# Patient Record
Sex: FEMALE | Race: WHITE | NOT HISPANIC OR LATINO
[De-identification: names, ages, dates, MRNs, and addresses within clinical notes are randomized per-mention and may not be internally consistent; named-entity substitution may affect disease eponyms.]

---

## 2017-04-14 ENCOUNTER — RX RENEWAL (OUTPATIENT)
Age: 72
End: 2017-04-14

## 2017-04-14 ENCOUNTER — APPOINTMENT (OUTPATIENT)
Dept: ENDOCRINOLOGY | Facility: CLINIC | Age: 72
End: 2017-04-14

## 2017-04-24 ENCOUNTER — APPOINTMENT (OUTPATIENT)
Dept: ENDOCRINOLOGY | Facility: CLINIC | Age: 72
End: 2017-04-24

## 2017-04-24 VITALS
OXYGEN SATURATION: 97 % | WEIGHT: 139 LBS | DIASTOLIC BLOOD PRESSURE: 80 MMHG | SYSTOLIC BLOOD PRESSURE: 130 MMHG | BODY MASS INDEX: 23.73 KG/M2 | HEART RATE: 78 BPM | HEIGHT: 64.25 IN

## 2017-07-10 ENCOUNTER — LABORATORY RESULT (OUTPATIENT)
Age: 72
End: 2017-07-10

## 2017-07-10 LAB
T3RU NFR SERPL: 0.99 INDEX
T4 SERPL-MCNC: 7.3 UG/DL
TSH SERPL-ACNC: 0.13 UIU/ML

## 2017-07-11 LAB
THYROGLOB AB SERPL-ACNC: <20 IU/ML
THYROGLOB SERPL-MCNC: <0.2 NG/ML

## 2018-03-20 ENCOUNTER — RX RENEWAL (OUTPATIENT)
Age: 73
End: 2018-03-20

## 2018-04-10 ENCOUNTER — RX RENEWAL (OUTPATIENT)
Age: 73
End: 2018-04-10

## 2018-04-25 ENCOUNTER — APPOINTMENT (OUTPATIENT)
Dept: ENDOCRINOLOGY | Facility: CLINIC | Age: 73
End: 2018-04-25
Payer: MEDICARE

## 2018-04-25 ENCOUNTER — LABORATORY RESULT (OUTPATIENT)
Age: 73
End: 2018-04-25

## 2018-04-25 VITALS
WEIGHT: 142 LBS | BODY MASS INDEX: 23.95 KG/M2 | SYSTOLIC BLOOD PRESSURE: 120 MMHG | OXYGEN SATURATION: 98 % | HEIGHT: 64.5 IN | DIASTOLIC BLOOD PRESSURE: 82 MMHG | HEART RATE: 80 BPM

## 2018-04-25 PROCEDURE — 99215 OFFICE O/P EST HI 40 MIN: CPT | Mod: 25,GC

## 2018-04-25 PROCEDURE — 77080 DXA BONE DENSITY AXIAL: CPT | Mod: GA,GC

## 2018-04-30 LAB
T3RU NFR SERPL: 0.98 INDEX
T4 SERPL-MCNC: 7.4 UG/DL
THYROGLOB AB SERPL-ACNC: <20 IU/ML
THYROGLOB SERPL-MCNC: <0.2 NG/ML
TSH SERPL-ACNC: 0.28 UIU/ML

## 2019-01-14 ENCOUNTER — OUTPATIENT (OUTPATIENT)
Dept: OUTPATIENT SERVICES | Facility: HOSPITAL | Age: 74
LOS: 1 days | End: 2019-01-14
Payer: MEDICARE

## 2019-01-14 ENCOUNTER — APPOINTMENT (OUTPATIENT)
Dept: RADIOLOGY | Facility: CLINIC | Age: 74
End: 2019-01-14
Payer: MEDICARE

## 2019-01-14 DIAGNOSIS — M54.5 LOW BACK PAIN: ICD-10-CM

## 2019-01-14 PROCEDURE — 72100 X-RAY EXAM L-S SPINE 2/3 VWS: CPT

## 2019-01-14 PROCEDURE — 72100 X-RAY EXAM L-S SPINE 2/3 VWS: CPT | Mod: 26

## 2019-01-20 ENCOUNTER — TRANSCRIPTION ENCOUNTER (OUTPATIENT)
Age: 74
End: 2019-01-20

## 2019-07-22 ENCOUNTER — LABORATORY RESULT (OUTPATIENT)
Age: 74
End: 2019-07-22

## 2019-07-22 ENCOUNTER — APPOINTMENT (OUTPATIENT)
Dept: ENDOCRINOLOGY | Facility: CLINIC | Age: 74
End: 2019-07-22
Payer: MEDICARE

## 2019-07-22 VITALS
WEIGHT: 139 LBS | DIASTOLIC BLOOD PRESSURE: 70 MMHG | SYSTOLIC BLOOD PRESSURE: 132 MMHG | HEART RATE: 69 BPM | HEIGHT: 64.5 IN | BODY MASS INDEX: 23.44 KG/M2 | OXYGEN SATURATION: 98 %

## 2019-07-22 PROCEDURE — 77080 DXA BONE DENSITY AXIAL: CPT | Mod: GA

## 2019-07-22 PROCEDURE — 99214 OFFICE O/P EST MOD 30 MIN: CPT | Mod: 25

## 2019-07-22 RX ORDER — NEBIVOLOL HYDROCHLORIDE 20 MG/1
20 TABLET ORAL
Refills: 0 | Status: ACTIVE | COMMUNITY

## 2019-07-23 LAB
ALBUMIN SERPL ELPH-MCNC: 4.2 G/DL
ALP BLD-CCNC: 48 U/L
ALT SERPL-CCNC: 11 U/L
ANION GAP SERPL CALC-SCNC: 15 MMOL/L
AST SERPL-CCNC: 21 U/L
BILIRUB SERPL-MCNC: 0.5 MG/DL
BUN SERPL-MCNC: 13 MG/DL
CALCIUM SERPL-MCNC: 9.7 MG/DL
CHLORIDE SERPL-SCNC: 104 MMOL/L
CO2 SERPL-SCNC: 25 MMOL/L
CREAT SERPL-MCNC: 0.87 MG/DL
GLUCOSE SERPL-MCNC: 70 MG/DL
POTASSIUM SERPL-SCNC: 4 MMOL/L
PROT SERPL-MCNC: 6.7 G/DL
SODIUM SERPL-SCNC: 144 MMOL/L
T3RU NFR SERPL: 0.9 TBI
T4 SERPL-MCNC: 8.8 UG/DL
THYROGLOB AB SERPL-ACNC: <20 IU/ML
THYROGLOB SERPL-MCNC: <0.2 NG/ML
TSH SERPL-ACNC: 0.04 UIU/ML

## 2019-07-23 NOTE — PHYSICAL EXAM
[Alert] : alert [No Acute Distress] : no acute distress [PERRL] : pupils equal, round and reactive to light [Normal Sclera/Conjunctiva] : normal sclera/conjunctiva [Normal TMs] : both tympanic membranes were normal [Normal Outer Ear/Nose] : the ears and nose were normal in appearance [Supple] : the neck was supple [No Respiratory Distress] : no respiratory distress [No Thyroid Nodules] : there were no palpable thyroid nodules [Normal Rate and Effort] : normal respiratory rhythm and effort [No Accessory Muscle Use] : no accessory muscle use [Clear to Auscultation] : lungs were clear to auscultation bilaterally [Normal Rate] : heart rate was normal  [Normal S1, S2] : normal S1 and S2 [Regular Rhythm] : with a regular rhythm [No Edema] : there was no peripheral edema [Carotids Normal] : carotid pulses were normal with no bruits [Normal Bowel Sounds] : normal bowel sounds [Soft] : abdomen soft [Not Tender] : non-tender [No Stigmata of Cushings Syndrome] : no stigmata of cushings syndrome [Normal Gait] : normal gait [No Rash] : no rash [No Joint Swelling] : no joint swelling seen [No Skin Lesions] : no skin lesions [Cranial Nerves Intact] : cranial nerves 2-12 were intact [No Motor Deficits] : the motor exam was normal [Oriented x3] : oriented to person, place, and time [Normal Insight/Judgement] : insight and judgment were intact [de-identified] : thyroidectomy scar

## 2019-07-23 NOTE — REVIEW OF SYSTEMS
[Fatigue] : no fatigue [Decreased Appetite] : appetite not decreased [Visual Field Defect] : no visual field defect [Blurry Vision] : no blurred vision [Dysphagia] : no dysphagia [Dysphonia] : no dysphonia [Chest Pain] : no chest pain [Shortness Of Breath] : no shortness of breath [Palpitations] : no palpitations [Wheezing] : no wheezing was heard [Nausea] : no nausea [Vomiting] : no vomiting was observed [Polyuria] : no polyuria [Irregular Menses] : regular menses [Joint Pain] : no joint pain [Joint Stiffness] : no joint stiffness [Acanthosis] : no acanthosis  [Hirsutism] : no hirsutism [Headache] : no headaches [Tremors] : no tremors [Depression] : no depression [Anxiety] : no anxiety [Polydipsia] : no polydipsia [Galactorrhea] : no galactorrhea  [Easy Bleeding] : no ~M tendency for easy bleeding [Easy Bruising] : no tendency for easy bruising

## 2019-07-23 NOTE — DATA REVIEWED
[FreeTextEntry1] : DXA scan\par   05/040/2015--------------05/13/2013\par Spine T-score: -2.2------      -2.0\par Fem Neck          -1.8-------   -1.5 \par Wrist                  -0.9------   -0.9\par

## 2019-07-23 NOTE — ASSESSMENT
[FreeTextEntry1] : 75 y/o F with postmenopausal osteoporosis and h/o thyroid cancer.\par \par 1. Patient s/p 4 Prolia injections. Repeat BMD 2017 showed continued decrease in femoral neck although total hip looks improved. Switched to alendronate 70 mg biweekly. Taking alendronate 70 mg weekly. Took correctly, tolerated well. No interval fx, no UGI sx, no thigh pain. Up-to-date with DDS. No ONJ. Pt started drug holiday 2018. BMD 7/2019 essentially stable in all sites. Results consistent with osteopenia. Recommend that pt continue drug holiday since BMD is stable off of rx. All questions answered, pt understands. \par \par 2. Follicular variant of papillary thyroid cancer in 2009. Patient without clinical evidence of recurrence. Pt taking LT4 112. No sx of hyperthyroidism or hypothyroidism. No local neck pain or dysphagia. Thyroid US done today shows no visible thyroid nodules. \par \par Request labs sent out. \par \par F/u in 1 year.

## 2019-07-23 NOTE — PROCEDURE
[FreeTextEntry1] : Thyroid US - 07/22/2019\par Left: No visible thyroid nodules\par \par Bone mineral density: 07/22/2019 \par Indication: vs 2018 \par Spine: -2.2, osteopenia, no significant change \par Total hip: -1.6, osteopenia, no significant change \par Femoral neck: -2.4, osteopenia, no significant change \par Proximal radius: -1.4, osteopenia, no significant change \par \par Bone mineral density April 24, 2017\par Spine -2.3, osteopenia, no significant change\par Total hip -1.5, osteopenia, +4.0%\par Femoral neck -2.5, osteoporosis, -7.6%\par Possible radius -0.8, normal, no significant change

## 2019-07-23 NOTE — HISTORY OF PRESENT ILLNESS
[Prolia (Denosumab)] : Prolia (Denosumab) [Alendronate (Fosomax)] : Alendronate [Patient taking Meds Correctly] : Patient is taking meds correctly [FreeTextEntry1] : 1 year f/u 74 year-old female with h/o postmenopausal osteoporosis and follicular variant of papillary thyroid cancer.\par  \par Osteoporosis: No interval fractures, falls, rashes or tight pain. Patient is very physically active (hiking, weight lifting). \par Patient received 4 doses of prolia and was changed to low-dose alendronate (70 mg biweekly). Pt now taking alendronate 70 mg weekly. Taking correctly tolerating well. No interval fractures. No UGI sx. Pt states that last year she had a back injury. Up-to-date with dentist\par \par Follicular variant of papillary thyroid cancer: Pt taking LT4 112. Total thyroidectomy in 2009. No dysphagia, no dysphonia, no neck discomfort. No hyper or hypo symptoms. Pt denies any changes in weight, lethargy, dry skin, hair loss. \par

## 2019-07-23 NOTE — END OF VISIT
[FreeTextEntry3] : I, Mariza Ceballos, authored this note working as a medical scribe for Dr. Beltran.  07/22/2019.  2:45PM. This note was authored by the medical scribe for me. I have reviewed, edited, and revised the note as needed. I am in agreement with the exam findings, imaging findings, and treatment plan.  Kade Beltran MD

## 2019-07-26 ENCOUNTER — TRANSCRIPTION ENCOUNTER (OUTPATIENT)
Age: 74
End: 2019-07-26

## 2019-07-29 ENCOUNTER — TRANSCRIPTION ENCOUNTER (OUTPATIENT)
Age: 74
End: 2019-07-29

## 2019-08-28 ENCOUNTER — RX RENEWAL (OUTPATIENT)
Age: 74
End: 2019-08-28

## 2020-08-31 ENCOUNTER — RX RENEWAL (OUTPATIENT)
Age: 75
End: 2020-08-31

## 2020-09-10 ENCOUNTER — APPOINTMENT (OUTPATIENT)
Dept: ENDOCRINOLOGY | Facility: CLINIC | Age: 75
End: 2020-09-10
Payer: MEDICARE

## 2020-09-10 ENCOUNTER — LABORATORY RESULT (OUTPATIENT)
Age: 75
End: 2020-09-10

## 2020-09-10 VITALS
HEART RATE: 73 BPM | DIASTOLIC BLOOD PRESSURE: 82 MMHG | OXYGEN SATURATION: 97 % | HEIGHT: 63.5 IN | SYSTOLIC BLOOD PRESSURE: 140 MMHG | TEMPERATURE: 97.9 F | BODY MASS INDEX: 23.45 KG/M2 | WEIGHT: 134 LBS

## 2020-09-10 PROCEDURE — 99214 OFFICE O/P EST MOD 30 MIN: CPT

## 2020-09-10 NOTE — REASON FOR VISIT
[Follow - Up] : a follow-up visit [Thyroid nodule/ MNG] : thyroid nodule/ MNG [Thyroid Cancer] : thyroid cancer

## 2020-09-11 NOTE — PHYSICAL EXAM
[No Acute Distress] : no acute distress [Alert] : alert [Well Nourished] : well nourished [Normal Sclera/Conjunctiva] : normal sclera/conjunctiva [Well Developed] : well developed [EOMI] : extra ocular movement intact [No Proptosis] : no proptosis [No Thyroid Nodules] : no palpable thyroid nodules [Thyroid Not Enlarged] : the thyroid was not enlarged [Normal Oropharynx] : the oropharynx was normal [Well Healed Scar] : well healed scar [No Respiratory Distress] : no respiratory distress [No Accessory Muscle Use] : no accessory muscle use [Normal Rate] : heart rate was normal [Normal S1, S2] : normal S1 and S2 [Clear to Auscultation] : lungs were clear to auscultation bilaterally [Normal Bowel Sounds] : normal bowel sounds [Regular Rhythm] : with a regular rhythm [No Edema] : no peripheral edema [Not Tender] : non-tender [Not Distended] : not distended [Normal Posterior Cervical Nodes] : no posterior cervical lymphadenopathy [Normal Anterior Cervical Nodes] : no anterior cervical lymphadenopathy [Soft] : abdomen soft [No Stigmata of Cushings Syndrome] : no stigmata of Cushings Syndrome [No Spinal Tenderness] : no spinal tenderness [Spine Straight] : spine straight [Normal Gait] : normal gait [No Rash] : no rash [Normal Strength/Tone] : muscle strength and tone were normal [Acanthosis Nigricans] : no acanthosis nigricans [Normal Reflexes] : deep tendon reflexes were 2+ and symmetric [No Tremors] : no tremors [Oriented x3] : oriented to person, place, and time

## 2020-09-11 NOTE — HISTORY OF PRESENT ILLNESS
[Alendronate (Fosomax)] : Alendronate [Prolia (Denosumab)] : Prolia (Denosumab) [Patient taking Meds Correctly] : Patient is taking meds correctly [FreeTextEntry1] :  \par Osteoporosis: No interval fractures, falls, rashes or tight pain. Patient is very physically active (hiking, weight lifting). \par Patient received 4 doses of prolia and was changed to low-dose alendronate (70 mg biweekly). Pt now taking alendronate 70 mg weekly. Taking correctly tolerating well. No interval fractures. No UGI sx. Pt states that last year she had a back injury. Up-to-date with dentist\par \par Follicular variant of papillary thyroid cancer: Pt taking LT4 112. Total thyroidectomy in 2009. No dysphagia, no dysphonia, no neck discomfort. No hyper or hypo symptoms. Pt denies any changes in weight, lethargy, dry skin, hair loss. \par

## 2020-09-11 NOTE — ASSESSMENT
[FreeTextEntry1] : 76 y/o F with postmenopausal osteoporosis and h/o thyroid cancer.\par \par 1. Patient s/p 4 Prolia injections. Repeat BMD 2017 showed continued decrease in femoral neck although total hip looks improved. Switched to alendronate 70 mg biweekly. Taking alendronate 70 mg weekly. Took correctly, tolerated well. No interval fx, no UGI sx, no thigh pain. Up-to-date with DDS. No ONJ. Pt started drug holiday 2018. BMD 7/2019 essentially stable in all sites. Results consistent with osteopenia. Recommend that pt continue drug holiday since BMD is stable off of rx. All questions answered, pt understands. \par \par 2. Follicular variant of papillary thyroid cancer in 2009. Patient without clinical evidence of recurrence. Pt taking LT4 112. No sx of hyperthyroidism or hypothyroidism. No local neck pain or dysphagia. Thyroid US no nodules\par \par Request labs sent out. \par \par F/u in 1 year.

## 2020-09-14 LAB
ALBUMIN SERPL ELPH-MCNC: 4.7 G/DL
ALP BLD-CCNC: 46 U/L
ALT SERPL-CCNC: 17 U/L
ANION GAP SERPL CALC-SCNC: 14 MMOL/L
AST SERPL-CCNC: 24 U/L
BILIRUB SERPL-MCNC: 0.4 MG/DL
BUN SERPL-MCNC: 14 MG/DL
CALCIUM SERPL-MCNC: 9.5 MG/DL
CHLORIDE SERPL-SCNC: 102 MMOL/L
CO2 SERPL-SCNC: 27 MMOL/L
CREAT SERPL-MCNC: 1 MG/DL
GLUCOSE SERPL-MCNC: 94 MG/DL
POTASSIUM SERPL-SCNC: 4.2 MMOL/L
PROT SERPL-MCNC: 6.7 G/DL
SODIUM SERPL-SCNC: 142 MMOL/L
T3RU NFR SERPL: 0.9 TBI
T4 SERPL-MCNC: 8.3 UG/DL
THYROGLOB AB SERPL-ACNC: <20 IU/ML
THYROGLOB SERPL-MCNC: <0.2 NG/ML
TSH SERPL-ACNC: 0.58 UIU/ML

## 2021-08-17 ENCOUNTER — RX RENEWAL (OUTPATIENT)
Age: 76
End: 2021-08-17

## 2021-12-20 ENCOUNTER — LABORATORY RESULT (OUTPATIENT)
Age: 76
End: 2021-12-20

## 2021-12-20 ENCOUNTER — APPOINTMENT (OUTPATIENT)
Dept: ENDOCRINOLOGY | Facility: CLINIC | Age: 76
End: 2021-12-20
Payer: MEDICARE

## 2021-12-20 VITALS
HEART RATE: 84 BPM | HEIGHT: 63.5 IN | BODY MASS INDEX: 23.45 KG/M2 | WEIGHT: 134 LBS | OXYGEN SATURATION: 97 % | DIASTOLIC BLOOD PRESSURE: 90 MMHG | SYSTOLIC BLOOD PRESSURE: 158 MMHG | RESPIRATION RATE: 16 BRPM | TEMPERATURE: 98.3 F

## 2021-12-20 PROCEDURE — ZZZZZ: CPT

## 2021-12-20 PROCEDURE — 77080 DXA BONE DENSITY AXIAL: CPT | Mod: GA

## 2021-12-20 PROCEDURE — 99214 OFFICE O/P EST MOD 30 MIN: CPT | Mod: 25

## 2021-12-20 RX ORDER — OLMESARTAN MEDOXOMIL 40 MG/1
40 TABLET, FILM COATED ORAL
Refills: 0 | Status: ACTIVE | COMMUNITY

## 2021-12-20 RX ORDER — AMLODIPINE BESYLATE 5 MG/1
5 TABLET ORAL
Refills: 0 | Status: ACTIVE | COMMUNITY

## 2021-12-21 NOTE — PROCEDURE
[FreeTextEntry1] : Bone mineral density: 12/20/2021 \par Indication: vs. 2019\par Spine: (L1-L3) -2.5 osteoporosis, -3.7%\par Total hip: -2.1 osteopenia, -8.4%\par Femoral neck: -2.6 osteoporosis, no significant change\par Proximal radius: -1.7 osteopenia, no significant change\par \par Thyroid US - 07/22/2019\par Left: No visible thyroid nodules\par \par Bone mineral density: 07/22/2019 \par Indication: vs 2018 \par Spine: -2.2, osteopenia, no significant change \par Total hip: -1.6, osteopenia, no significant change \par Femoral neck: -2.4, osteopenia, no significant change \par Proximal radius: -1.4, osteopenia, no significant change \par \par Bone mineral density April 24, 2017\par Spine -2.3, osteopenia, no significant change\par Total hip -1.5, osteopenia, +4.0%\par Femoral neck -2.5, osteoporosis, -7.6%\par Possible radius -0.8, normal, no significant change

## 2021-12-21 NOTE — HISTORY OF PRESENT ILLNESS
[Alendronate (Fosomax)] : Alendronate [Denosumab (Prolia)] : Denosumab [FreeTextEntry1] : No significant interval health changes. No interval surgery, hospitalizations, fractures, or change in medications.\par \par Osteoporosis: No interval fractures, falls, rashes or tight pain. Patient is very physically active (hiking, weight lifting). Patient received 4 doses of Prolia. BMD 2017 showed continued decrease in femoral neck although total hip looks improved. Switched to alendronate 70 mg initially on biweekly, then weekly dose. Took correctly, tolerated well. Pt started drug holiday 2018. BMD 7/2019 essentially stable in all sites. Results consistent with osteopenia. Recommended pt continue drug holiday.\par \par Follicular variant of papillary thyroid cancer. Total thyroidectomy in 2009. Pt taking LT4 100 mcg daily. No local neck pain. No dysphagia or dysphonia. No raciness, shakiness, heat/cold intolerance, tiredness, or fatigue. No palpitations, tremors, or sudden weight gain/loss.

## 2021-12-21 NOTE — ASSESSMENT
[Bisphosphonate Therapy] : Risks and benefits of bisphosphonate therapy were  discussed with the patient including gastroesophageal irritation, osteonecrosis of the jaw, and atypical femur fractures, and acute phase reaction [Bisphosphonates] : The patient was instructed to take bisphosphonates on an empty stomach with a full glass of water,and wait at least 30 minutes before eating or lying down [FreeTextEntry1] : 75 y/o F with postmenopausal osteoporosis and h/o thyroid cancer.\par \par 1. Patient s/p 4 Prolia injections. BMD 2017 showed continued decrease in femoral neck although total hip looks improved. Switched to alendronate 70 mg initially on biweekly, then weekly dose. Took correctly, tolerated well. Pt started drug holiday 2018. BMD 7/2019 essentially stable in all sites. Results consistent with osteopenia. Recommended pt continue drug holiday. BMD 12/2021 indicates worsened now osteoporosis in spine, significantly worsened osteopenia in total hip, stable osteoporosis in femoral neck, and stable osteopenia in proximal radius. BMD results reviewed w/ pt.\par \par Patient advised for an increased risk of future fx. Options for medical therapy discussed in detail. Recommend pt restart bisphosphonates as she previously tolerated this rx class, with the expectation of a future drug holiday within 5 years. Risks and benefits discussed. All questions were answered. Pt agrees to restart therapy w/ Actonel. Medication instructions reviewed. Prescription sent.\par \par 2. Follicular variant of papillary thyroid cancer in 2009. Patient without clinical evidence of recurrence. Pt taking LT4 100 mcg daily. No sx of hyperthyroidism or hypothyroidism. No local neck pain. No dysphagia or dysphonia. No raciness, shakiness, heat/cold intolerance, tiredness, or fatigue. No palpitations, tremors, or sudden weight gain/loss. Thyroid US indicated no nodules.\par \par Request labs sent out. Repeat TFT including TG.\par \par F/u in 1 year w/ BMD [FreeTextEntry3] : IV Reclast

## 2021-12-21 NOTE — END OF VISIT
[FreeTextEntry3] : I, William Murphy, authored this note working as a medical scribe for Dr. Beltran.  12/20/2021.  5:15PM. This note was authored by the medical scribe for me. I have reviewed, edited, and revised the note as needed. I am in agreement with the exam findings, imaging findings, and treatment plan.  Kade Beltran MD

## 2021-12-22 LAB
25(OH)D3 SERPL-MCNC: 42.3 NG/ML
ALBUMIN SERPL ELPH-MCNC: 4.8 G/DL
ALP BLD-CCNC: 55 U/L
ALT SERPL-CCNC: 25 U/L
ANION GAP SERPL CALC-SCNC: 13 MMOL/L
AST SERPL-CCNC: 35 U/L
BASOPHILS # BLD AUTO: 0.05 K/UL
BASOPHILS NFR BLD AUTO: 0.8 %
BILIRUB SERPL-MCNC: 0.3 MG/DL
BUN SERPL-MCNC: 14 MG/DL
CALCIUM SERPL-MCNC: 9.7 MG/DL
CHLORIDE SERPL-SCNC: 101 MMOL/L
CHOLEST SERPL-MCNC: 238 MG/DL
CO2 SERPL-SCNC: 28 MMOL/L
CREAT SERPL-MCNC: 0.89 MG/DL
EOSINOPHIL # BLD AUTO: 0.09 K/UL
EOSINOPHIL NFR BLD AUTO: 1.4 %
GLUCOSE SERPL-MCNC: 95 MG/DL
HCT VFR BLD CALC: 42 %
HDLC SERPL-MCNC: 93 MG/DL
HGB BLD-MCNC: 13.9 G/DL
IMM GRANULOCYTES NFR BLD AUTO: 0.3 %
LDLC SERPL CALC-MCNC: 112 MG/DL
LYMPHOCYTES # BLD AUTO: 2.23 K/UL
LYMPHOCYTES NFR BLD AUTO: 34 %
MAN DIFF?: NORMAL
MCHC RBC-ENTMCNC: 31.7 PG
MCHC RBC-ENTMCNC: 33.1 GM/DL
MCV RBC AUTO: 95.7 FL
MONOCYTES # BLD AUTO: 0.53 K/UL
MONOCYTES NFR BLD AUTO: 8.1 %
NEUTROPHILS # BLD AUTO: 3.63 K/UL
NEUTROPHILS NFR BLD AUTO: 55.4 %
NONHDLC SERPL-MCNC: 145 MG/DL
PLATELET # BLD AUTO: 227 K/UL
POTASSIUM SERPL-SCNC: 3.6 MMOL/L
PROT SERPL-MCNC: 7 G/DL
RBC # BLD: 4.39 M/UL
RBC # FLD: 13.2 %
SODIUM SERPL-SCNC: 142 MMOL/L
T3RU NFR SERPL: 1.2 TBI
T4 SERPL-MCNC: 5.7 UG/DL
THYROGLOB AB SERPL-ACNC: <20 IU/ML
THYROGLOB SERPL-MCNC: <0.2 NG/ML
TRIGL SERPL-MCNC: 165 MG/DL
TSH SERPL-ACNC: 26 UIU/ML
WBC # FLD AUTO: 6.55 K/UL

## 2021-12-23 ENCOUNTER — TRANSCRIPTION ENCOUNTER (OUTPATIENT)
Age: 76
End: 2021-12-23

## 2022-02-04 ENCOUNTER — LABORATORY RESULT (OUTPATIENT)
Age: 77
End: 2022-02-04

## 2022-02-07 LAB
T3RU NFR SERPL: 1.1 TBI
T4 SERPL-MCNC: 6.4 UG/DL
TSH SERPL-ACNC: 17.2 UIU/ML

## 2022-08-11 ENCOUNTER — APPOINTMENT (OUTPATIENT)
Dept: MAMMOGRAPHY | Facility: CLINIC | Age: 77
End: 2022-08-11

## 2022-08-11 ENCOUNTER — APPOINTMENT (OUTPATIENT)
Dept: ULTRASOUND IMAGING | Facility: CLINIC | Age: 77
End: 2022-08-11

## 2022-08-11 PROCEDURE — 77067 SCR MAMMO BI INCL CAD: CPT

## 2022-08-11 PROCEDURE — 77063 BREAST TOMOSYNTHESIS BI: CPT

## 2022-08-11 PROCEDURE — 76641 ULTRASOUND BREAST COMPLETE: CPT | Mod: 50

## 2022-10-14 ENCOUNTER — RX RENEWAL (OUTPATIENT)
Age: 77
End: 2022-10-14

## 2022-11-07 ENCOUNTER — RX RENEWAL (OUTPATIENT)
Age: 77
End: 2022-11-07

## 2022-11-21 ENCOUNTER — OFFICE (OUTPATIENT)
Dept: URBAN - METROPOLITAN AREA CLINIC 27 | Facility: CLINIC | Age: 77
Setting detail: OPHTHALMOLOGY
End: 2022-11-21
Payer: MEDICARE

## 2022-11-21 DIAGNOSIS — H40.013: ICD-10-CM

## 2022-11-21 DIAGNOSIS — H16.223: ICD-10-CM

## 2022-11-21 DIAGNOSIS — H52.4: ICD-10-CM

## 2022-11-21 DIAGNOSIS — H25.13: ICD-10-CM

## 2022-11-21 PROBLEM — H52.7 REFRACTIVE ERROR: Status: ACTIVE | Noted: 2022-11-21

## 2022-11-21 PROCEDURE — 92012 INTRM OPH EXAM EST PATIENT: CPT | Performed by: OPHTHALMOLOGY

## 2022-11-21 PROCEDURE — 92133 CPTRZD OPH DX IMG PST SGM ON: CPT | Performed by: OPHTHALMOLOGY

## 2022-11-21 PROCEDURE — 92083 EXTENDED VISUAL FIELD XM: CPT | Performed by: OPHTHALMOLOGY

## 2022-11-21 PROCEDURE — 92015 DETERMINE REFRACTIVE STATE: CPT | Performed by: OPHTHALMOLOGY

## 2022-11-21 ASSESSMENT — TONOMETRY
OS_IOP_MMHG: 15
OD_IOP_MMHG: 17

## 2022-11-21 ASSESSMENT — SPHEQUIV_DERIVED
OS_SPHEQUIV: 1.25
OS_SPHEQUIV: 2.875
OS_SPHEQUIV: 2.125
OD_SPHEQUIV: 2
OD_SPHEQUIV: 1.875
OD_SPHEQUIV: 2

## 2022-11-21 ASSESSMENT — REFRACTION_AUTOREFRACTION
OS_AXIS: 131
OS_SPHERE: +2.25
OD_CYLINDER: +1.50
OD_AXIS: 088
OD_SPHERE: +1.25
OS_CYLINDER: +1.25

## 2022-11-21 ASSESSMENT — REFRACTION_CURRENTRX
OS_OVR_VA: 20/
OS_AXIS: 103
OD_CYLINDER: +0.75
OS_SPHERE: +0.75
OD_OVR_VA: 20/
OS_SPHERE: +2.75
OS_SPHERE: +3.75
OD_SPHERE: +4.25
OS_AXIS: 088
OD_OVR_VA: 20/
OS_CYLINDER: +0.25
OS_CYLINDER: +0.50
OD_OVR_VA: 20/
OD_VPRISM_DIRECTION: SV
OD_VPRISM_DIRECTION: PROGS
OS_ADD: +1.75
OS_OVR_VA: 20/
OD_AXIS: 097
OD_AXIS: 104
OS_CYLINDER: +0.25
OD_SPHERE: +3.50
OD_CYLINDER: +0.25
OD_AXIS: 092
OD_SPHERE: +1.50
OS_VPRISM_DIRECTION: PROGS
OS_SPHERE: +1.00
OD_ADD: +1.75
OD_CYLINDER: +0.75
OD_SPHERE: +1.50
OS_CYLINDER: +0.50
OD_CYLINDER: +0.25
OD_AXIS: 092
OS_AXIS: 109
OS_AXIS: 086
OS_VPRISM_DIRECTION: SV
OS_OVR_VA: 20/

## 2022-11-21 ASSESSMENT — REFRACTION_MANIFEST
OD_CYLINDER: +1.50
OS_VA2: 20/30
OD_VA1: 20/30
OD_SPHERE: +1.25
OD_AXIS: 95
OS_SPHERE: +1.75
OD_CYLINDER: +0.75
OD_VA1: 20/40+
OS_VA1: 20/25-2
OD_ADD: +2.75
OD_ADD: +2.75
OS_CYLINDER: +0.50
OS_ADD: +2.75
OS_ADD: +2.75
OS_SPHERE: +1.00
OS_AXIS: 115
OD_AXIS: 95
OD_VA2: 20/30
OD_SPHERE: +1.50
OS_AXIS: 105
OS_VA1: 20/25+2
OS_CYLINDER: +0.75

## 2022-11-21 ASSESSMENT — KERATOMETRY
OS_K1POWER_DIOPTERS: 43.00
OS_K2POWER_DIOPTERS: 44.50
OS_AXISANGLE_DEGREES: 099
OD_K2POWER_DIOPTERS: 43.75
OD_K1POWER_DIOPTERS: 43.25
OD_AXISANGLE_DEGREES: 064

## 2022-11-21 ASSESSMENT — AXIALLENGTH_DERIVED
OS_AL: 22.7067
OS_AL: 22.4392
OS_AL: 23.0269
OD_AL: 22.8833
OD_AL: 22.8376
OD_AL: 22.8376

## 2022-11-21 ASSESSMENT — CONFRONTATIONAL VISUAL FIELD TEST (CVF)
OS_FINDINGS: FULL
OD_FINDINGS: FULL

## 2022-11-21 ASSESSMENT — PACHYMETRY
OD_CT_CORRECTION: -1
OD_CT_UM: 555
OS_CT_CORRECTION: 1
OS_CT_UM: 528

## 2022-11-21 ASSESSMENT — SUPERFICIAL PUNCTATE KERATITIS (SPK)
OS_SPK: 1+
OD_SPK: 1+

## 2022-11-21 ASSESSMENT — VISUAL ACUITY
OS_BCVA: 20/50+2
OD_BCVA: 20/30-2

## 2022-12-14 ENCOUNTER — APPOINTMENT (OUTPATIENT)
Dept: ENDOCRINOLOGY | Facility: CLINIC | Age: 77
End: 2022-12-14

## 2022-12-14 VITALS
HEIGHT: 63.5 IN | BODY MASS INDEX: 23.45 KG/M2 | OXYGEN SATURATION: 97 % | SYSTOLIC BLOOD PRESSURE: 132 MMHG | WEIGHT: 134 LBS | HEART RATE: 71 BPM | DIASTOLIC BLOOD PRESSURE: 78 MMHG

## 2022-12-14 PROCEDURE — 77080 DXA BONE DENSITY AXIAL: CPT

## 2022-12-14 PROCEDURE — 99214 OFFICE O/P EST MOD 30 MIN: CPT | Mod: 25

## 2022-12-14 PROCEDURE — ZZZZZ: CPT

## 2022-12-15 NOTE — PROCEDURE
[FreeTextEntry1] : Bone Density 12/14/2022\par Indication vs 2021 Asses response to medication \par Spine L1-L3 -2.1 osteopenia +4.8% \par Total Hip -2.1 osteopenia , no significant change \par Femoral Neck -2.6 osteoporosis , no significant change \par Proximal Radius -1.3 osteopenia +3.7% \par \par Bone mineral density: 12/20/2021 \par Indication: vs. 2019\par Spine: (L1-L3) -2.5 osteoporosis, -3.7%\par Total hip: -2.1 osteopenia, -8.4%\par Femoral neck: -2.6 osteoporosis, no significant change\par Proximal radius: -1.7 osteopenia, no significant change\par \par Thyroid US - 07/22/2019\par Left: No visible thyroid nodules\par \par Bone mineral density: 07/22/2019 \par Indication: vs 2018 \par Spine: -2.2, osteopenia, no significant change \par Total hip: -1.6, osteopenia, no significant change \par Femoral neck: -2.4, osteopenia, no significant change \par Proximal radius: -1.4, osteopenia, no significant change \par \par Bone mineral density April 24, 2017\par Spine -2.3, osteopenia, no significant change\par Total hip -1.5, osteopenia, +4.0%\par Femoral neck -2.5, osteoporosis, -7.6%\par Possible radius -0.8, normal, no significant change

## 2022-12-15 NOTE — HISTORY OF PRESENT ILLNESS
[Alendronate (Fosomax)] : Alendronate [Denosumab (Prolia)] : Denosumab [FreeTextEntry1] :  Patient returns for a follow up visit for osteoporosis and hypothyroidism.  Pt is now taking rosuvastatin for hyperlipidemia. \par \par Osteoporosis: No interval fractures, falls, rashes or tight pain. Patient is very physically active (hiking, weight lifting). Patient received 4 doses of Prolia. BMD 2017 showed continued decrease in femoral neck although total hip looks improved. Switched to alendronate 70 mg initially on biweekly, then weekly dose. Took correctly, tolerated well. Pt started drug holiday 2018. BMD 7/2019 essentially stable in all sites. Results consistent with osteopenia. Recommended pt continue drug holiday.\par \par Follicular variant of papillary thyroid cancer. Total thyroidectomy in 2009. Pt taking LT4 100 mcg daily and 2 on Sunday. No local neck pain. No dysphagia or dysphonia. No raciness, shakiness, heat/cold intolerance, tiredness, or fatigue. No palpitations, tremors, or sudden weight gain/loss.

## 2022-12-15 NOTE — END OF VISIT
[FreeTextEntry3] : This note was written by Leola Reed on ( December 14 , 2022) acting as a medical scribe for Dr. Beltran  This note was authored by the medical scribe for me. I have reviewed, edited, and revised the note as needed. I am in agreement with the exam findings, imaging findings, and treatment plan.  Kade Beltran MD \par

## 2022-12-15 NOTE — ASSESSMENT
[Bisphosphonate Therapy] : Risks and benefits of bisphosphonate therapy were  discussed with the patient including gastroesophageal irritation, osteonecrosis of the jaw, and atypical femur fractures, and acute phase reaction [Bisphosphonates] : The patient was instructed to take bisphosphonates on an empty stomach with a full glass of water,and wait at least 30 minutes before eating or lying down [FreeTextEntry3] : IV Reclast [FreeTextEntry1] : 76 y/o F with postmenopausal osteoporosis and h/o thyroid cancer.\par \par 1. Patient s/p 4 Prolia injections. BMD 2017 showed continued decrease in femoral neck although total hip looks improved. Switched to alendronate 70 mg initially on biweekly, then weekly dose. Took correctly, tolerated well. Pt started drug holiday 2018. BMD 7/2019 essentially stable in all sites. Results consistent with osteopenia. Recommended pt continue drug holiday. BMD 12/2021 indicates worsened now osteoporosis in spine, significantly worsened osteopenia in total hip, stable osteoporosis in femoral neck, and stable osteopenia in proximal radius. Pt began Actonel December 2021, taking correctly tolerating well. BMD 12/2022 shows that there has been improvement in bone density in the spine and proximal radius. BMD results reviewed w/ pt.\par Renew Actonel\par 2. Follicular variant of papillary thyroid cancer in 2009. Patient without clinical evidence of recurrence. Pt taking LT4 100 mcg daily. No sx of hyperthyroidism or hypothyroidism. No local neck pain. No dysphagia or dysphonia. No raciness, shakiness, heat/cold intolerance, tiredness, or fatigue. No palpitations, tremors, or sudden weight gain/loss. Thyroid US indicated no nodules.\par \par Call Dr. Snider for labs \par \par F/u in 1 year

## 2023-08-04 ENCOUNTER — OFFICE (OUTPATIENT)
Dept: URBAN - METROPOLITAN AREA CLINIC 27 | Facility: CLINIC | Age: 78
Setting detail: OPHTHALMOLOGY
End: 2023-08-04
Payer: MEDICARE

## 2023-08-04 DIAGNOSIS — H43.813: ICD-10-CM

## 2023-08-04 DIAGNOSIS — H40.013: ICD-10-CM

## 2023-08-04 DIAGNOSIS — H16.223: ICD-10-CM

## 2023-08-04 DIAGNOSIS — H25.13: ICD-10-CM

## 2023-08-04 PROCEDURE — 92202 OPSCPY EXTND ON/MAC DRAW: CPT | Performed by: OPHTHALMOLOGY

## 2023-08-04 PROCEDURE — 92012 INTRM OPH EXAM EST PATIENT: CPT | Performed by: OPHTHALMOLOGY

## 2023-08-04 ASSESSMENT — REFRACTION_AUTOREFRACTION
OD_SPHERE: +1.50
OD_CYLINDER: +1.75
OS_CYLINDER: +1.50
OS_AXIS: 1356
OD_AXIS: 085
OS_SPHERE: +2.00

## 2023-08-04 ASSESSMENT — KERATOMETRY
OD_K1POWER_DIOPTERS: 43.00
OS_AXISANGLE_DEGREES: 097
OS_K1POWER_DIOPTERS: 43.00
METHOD_AUTO_MANUAL: AUTO
OS_K2POWER_DIOPTERS: 44.75
OD_AXISANGLE_DEGREES: 074
OD_K2POWER_DIOPTERS: 44.25

## 2023-08-04 ASSESSMENT — REFRACTION_CURRENTRX
OD_CYLINDER: +0.75
OD_VPRISM_DIRECTION: PROGS
OS_CYLINDER: +0.50
OD_SPHERE: +4.25
OS_OVR_VA: 20/
OS_OVR_VA: 20/
OD_CYLINDER: +0.25
OS_SPHERE: +2.75
OS_VPRISM_DIRECTION: PROGS
OD_OVR_VA: 20/
OS_CYLINDER: +0.25
OD_SPHERE: +1.50
OD_AXIS: 104
OD_CYLINDER: +0.25
OS_OVR_VA: 20/
OD_AXIS: 092
OS_CYLINDER: +0.25
OD_OVR_VA: 20/
OD_VPRISM_DIRECTION: SV
OD_CYLINDER: +0.75
OS_AXIS: 088
OS_SPHERE: +0.75
OD_OVR_VA: 20/
OS_SPHERE: +1.00
OD_AXIS: 097
OS_AXIS: 086
OS_SPHERE: +3.75
OD_ADD: +1.75
OD_AXIS: 092
OD_SPHERE: +1.50
OD_SPHERE: +3.50
OS_CYLINDER: +0.50
OS_AXIS: 109
OS_VPRISM_DIRECTION: SV
OS_AXIS: 103
OS_ADD: +1.75

## 2023-08-04 ASSESSMENT — VISUAL ACUITY
OD_BCVA: 20/20-1
OS_BCVA: 20/40-2

## 2023-08-04 ASSESSMENT — REFRACTION_MANIFEST
OS_AXIS: 115
OD_AXIS: 95
OD_CYLINDER: +1.50
OS_ADD: +2.75
OD_SPHERE: +1.50
OS_VA1: 20/25+2
OD_ADD: +2.75
OD_CYLINDER: +0.75
OS_VA1: 20/25-2
OS_VA2: 20/30
OS_AXIS: 105
OD_SPHERE: +1.25
OS_CYLINDER: +0.75
OD_VA1: 20/40+
OS_CYLINDER: +0.50
OS_SPHERE: +1.00
OS_ADD: +2.75
OD_AXIS: 95
OD_ADD: +2.75
OS_SPHERE: +1.75
OD_VA1: 20/30
OD_VA2: 20/30

## 2023-08-04 ASSESSMENT — SUPERFICIAL PUNCTATE KERATITIS (SPK)
OS_SPK: 1+
OD_SPK: 1+

## 2023-08-04 ASSESSMENT — SPHEQUIV_DERIVED
OS_SPHEQUIV: 2.125
OS_SPHEQUIV: 2.75
OD_SPHEQUIV: 2.375
OS_SPHEQUIV: 1.25
OD_SPHEQUIV: 2
OD_SPHEQUIV: 1.875

## 2023-08-04 ASSESSMENT — PACHYMETRY
OS_CT_UM: 528
OD_CT_CORRECTION: -1
OS_CT_CORRECTION: 1
OD_CT_UM: 555

## 2023-08-04 ASSESSMENT — AXIALLENGTH_DERIVED
OD_AL: 22.7947
OS_AL: 22.6642
OS_AL: 22.9833
OD_AL: 22.6591
OD_AL: 22.8402
OS_AL: 22.4417

## 2023-08-04 ASSESSMENT — TONOMETRY
OS_IOP_MMHG: 16
OD_IOP_MMHG: 17

## 2023-10-17 ENCOUNTER — RX RENEWAL (OUTPATIENT)
Age: 78
End: 2023-10-17

## 2023-12-20 ENCOUNTER — APPOINTMENT (OUTPATIENT)
Dept: ULTRASOUND IMAGING | Facility: CLINIC | Age: 78
End: 2023-12-20
Payer: MEDICARE

## 2023-12-20 ENCOUNTER — APPOINTMENT (OUTPATIENT)
Dept: MAMMOGRAPHY | Facility: CLINIC | Age: 78
End: 2023-12-20
Payer: MEDICARE

## 2023-12-20 PROCEDURE — 77063 BREAST TOMOSYNTHESIS BI: CPT

## 2023-12-20 PROCEDURE — 77067 SCR MAMMO BI INCL CAD: CPT

## 2023-12-20 PROCEDURE — 76641 ULTRASOUND BREAST COMPLETE: CPT | Mod: 50,3G

## 2023-12-21 ENCOUNTER — APPOINTMENT (OUTPATIENT)
Dept: ENDOCRINOLOGY | Facility: CLINIC | Age: 78
End: 2023-12-21
Payer: MEDICARE

## 2023-12-21 ENCOUNTER — LABORATORY RESULT (OUTPATIENT)
Age: 78
End: 2023-12-21

## 2023-12-21 VITALS
OXYGEN SATURATION: 98 % | HEIGHT: 63.5 IN | HEART RATE: 72 BPM | WEIGHT: 134 LBS | BODY MASS INDEX: 23.45 KG/M2 | SYSTOLIC BLOOD PRESSURE: 126 MMHG | DIASTOLIC BLOOD PRESSURE: 86 MMHG

## 2023-12-21 DIAGNOSIS — C73 MALIGNANT NEOPLASM OF THYROID GLAND: ICD-10-CM

## 2023-12-21 DIAGNOSIS — E89.0 POSTPROCEDURAL HYPOTHYROIDISM: ICD-10-CM

## 2023-12-21 DIAGNOSIS — M81.0 AGE-RELATED OSTEOPOROSIS W/OUT CURRENT PATHOLOGICAL FRACTURE: ICD-10-CM

## 2023-12-21 PROCEDURE — 99214 OFFICE O/P EST MOD 30 MIN: CPT

## 2023-12-21 NOTE — PHYSICAL EXAM
[Alert] : alert [Well Nourished] : well nourished [No Acute Distress] : no acute distress [Well Developed] : well developed [Normal Sclera/Conjunctiva] : normal sclera/conjunctiva [EOMI] : extra ocular movement intact [No Proptosis] : no proptosis [Normal Lips/Gums] : the lips and gums were normal [Normal Oropharynx] : the oropharynx was normal [Thyroid Not Enlarged] : the thyroid was not enlarged [No Thyroid Nodules] : no palpable thyroid nodules [Well Healed Scar] : well healed scar [Clear to Auscultation] : lungs were clear to auscultation bilaterally [Normal S1, S2] : normal S1 and S2 [Normal Rate] : heart rate was normal [Regular Rhythm] : with a regular rhythm [No Edema] : no peripheral edema [Not Tender] : non-tender [Not Distended] : not distended [Soft] : abdomen soft [Normal Anterior Cervical Nodes] : no anterior cervical lymphadenopathy [No Spinal Tenderness] : no spinal tenderness [Spine Straight] : spine straight [No Stigmata of Cushings Syndrome] : no stigmata of Cushings Syndrome [Normal Gait] : normal gait [Normal Reflexes] : deep tendon reflexes were 2+ and symmetric [No Tremors] : no tremors [Oriented x3] : oriented to person, place, and time

## 2023-12-22 NOTE — PROCEDURE
[FreeTextEntry1] : Bone Density 12/14/2022 Indication vs 2021 Assess response to medication  Spine L1-L3 -2.1 osteopenia +4.8%  Total Hip -2.1 osteopenia, no significant change  Femoral Neck -2.6 osteoporosis, no significant change  Proximal Radius -1.3 osteopenia +3.7%   Bone mineral density: 12/20/2021  Indication: vs. 2019 Spine: (L1-L3) -2.5 osteoporosis, -3.7% Total hip: -2.1 osteopenia, -8.4% Femoral neck: -2.6 osteoporosis, no significant change Proximal radius: -1.7 osteopenia, no significant change  Thyroid US - 07/22/2019 Left: No visible thyroid nodules  Bone mineral density: 07/22/2019  Indication: vs 2018  Spine: -2.2, osteopenia, no significant change  Total hip: -1.6, osteopenia, no significant change  Femoral neck: -2.4, osteopenia, no significant change  Proximal radius: -1.4, osteopenia, no significant change   Bone mineral density April 24, 2017 Spine -2.3, osteopenia, no significant change Total hip -1.5, osteopenia, +4.0% Femoral neck -2.5, osteoporosis, -7.6% Possible radius -0.8, normal, no significant change

## 2023-12-22 NOTE — HISTORY OF PRESENT ILLNESS
[Alendronate (Fosomax)] : Alendronate [Denosumab (Prolia)] : Denosumab [Risedronate (Actonel)] : Risedronate [FreeTextEntry1] : Patient returns for a follow up visit for osteoporosis and hypothyroidism. No interval surgeries, hospitalizations, or new medications.  Osteoporosis: No interval fractures, falls. Patient is very physically active (hiking, weight lifting). Patient received 4 doses of Prolia. BMD 2017 showed continued decrease in femoral neck although total hip looks improved. Switched to alendronate 70 mg initially on biweekly, then weekly dose. Took correctly, tolerated well. Pt started drug holiday 2018. BMD 7/2019 essentially stable in all sites. Results consistent with osteopenia. Restarted Risedronate 12/2021. Taking correctly, tolerating well. UTD DDS. No ONJ or AFF. BMD 12/2022 shows improvement in spine and forearm, fem neck stable osteoporosis -2.6.  Follicular variant of papillary thyroid cancer. Total thyroidectomy in 2009. Pt taking LT4 100 mcg daily and 2 on Sunday. No local neck pain. No dysphagia or dysphonia. No raciness, shakiness, heat/cold intolerance, tiredness, or fatigue. No palpitations, tremors, or sudden weight gain/loss.  On rosuvastatin for hyperlipidemia.

## 2023-12-23 LAB
ALBUMIN SERPL ELPH-MCNC: 4.9 G/DL
ALP BLD-CCNC: 56 U/L
ALT SERPL-CCNC: 22 U/L
ANION GAP SERPL CALC-SCNC: 11 MMOL/L
AST SERPL-CCNC: 26 U/L
BILIRUB SERPL-MCNC: 0.3 MG/DL
BUN SERPL-MCNC: 14 MG/DL
CALCIUM SERPL-MCNC: 10.1 MG/DL
CHLORIDE SERPL-SCNC: 100 MMOL/L
CO2 SERPL-SCNC: 30 MMOL/L
CREAT SERPL-MCNC: 0.73 MG/DL
EGFR: 85 ML/MIN/1.73M2
FT4I SERPL CALC-MCNC: 7.4 INDEX
GLUCOSE SERPL-MCNC: 93 MG/DL
POTASSIUM SERPL-SCNC: 4.1 MMOL/L
PROT SERPL-MCNC: 7.5 G/DL
SODIUM SERPL-SCNC: 141 MMOL/L
T3RU NFR SERPL: 1 TBI
TSH SERPL-ACNC: 9.13 UIU/ML

## 2023-12-23 RX ORDER — LEVOTHYROXINE SODIUM 0.12 MG/1
125 TABLET ORAL DAILY
Qty: 90 | Refills: 3 | Status: ACTIVE | COMMUNITY
Start: 2022-10-14 | End: 1900-01-01

## 2023-12-26 ENCOUNTER — TRANSCRIPTION ENCOUNTER (OUTPATIENT)
Age: 78
End: 2023-12-26

## 2023-12-29 LAB — COLLAGEN CTX SERPL-MCNC: 105 PG/ML

## 2024-01-04 ENCOUNTER — TRANSCRIPTION ENCOUNTER (OUTPATIENT)
Age: 79
End: 2024-01-04

## 2024-03-18 ENCOUNTER — RX RENEWAL (OUTPATIENT)
Age: 79
End: 2024-03-18

## 2024-03-18 RX ORDER — LEVOTHYROXINE SODIUM 0.1 MG/1
100 TABLET ORAL
Qty: 90 | Refills: 3 | Status: ACTIVE | COMMUNITY
Start: 2024-03-18 | End: 1900-01-01

## 2024-06-27 ENCOUNTER — RX RENEWAL (OUTPATIENT)
Age: 79
End: 2024-06-27

## 2024-08-23 ENCOUNTER — RX RENEWAL (OUTPATIENT)
Age: 79
End: 2024-08-23